# Patient Record
Sex: FEMALE | Race: WHITE | NOT HISPANIC OR LATINO
[De-identification: names, ages, dates, MRNs, and addresses within clinical notes are randomized per-mention and may not be internally consistent; named-entity substitution may affect disease eponyms.]

---

## 2023-02-16 PROBLEM — Z00.00 ENCOUNTER FOR PREVENTIVE HEALTH EXAMINATION: Status: ACTIVE | Noted: 2023-02-16

## 2023-02-17 ENCOUNTER — APPOINTMENT (OUTPATIENT)
Dept: OTOLARYNGOLOGY | Facility: CLINIC | Age: 68
End: 2023-02-17
Payer: MEDICARE

## 2023-02-17 ENCOUNTER — NON-APPOINTMENT (OUTPATIENT)
Age: 68
End: 2023-02-17

## 2023-02-17 VITALS
BODY MASS INDEX: 25.46 KG/M2 | HEIGHT: 68 IN | HEART RATE: 81 BPM | SYSTOLIC BLOOD PRESSURE: 134 MMHG | DIASTOLIC BLOOD PRESSURE: 85 MMHG | WEIGHT: 168 LBS

## 2023-02-17 DIAGNOSIS — Z78.9 OTHER SPECIFIED HEALTH STATUS: ICD-10-CM

## 2023-02-17 DIAGNOSIS — Z82.49 FAMILY HISTORY OF ISCHEMIC HEART DISEASE AND OTHER DISEASES OF THE CIRCULATORY SYSTEM: ICD-10-CM

## 2023-02-17 DIAGNOSIS — H90.3 SENSORINEURAL HEARING LOSS, BILATERAL: ICD-10-CM

## 2023-02-17 DIAGNOSIS — H81.10 BENIGN PAROXYSMAL VERTIGO, UNSPECIFIED EAR: ICD-10-CM

## 2023-02-17 DIAGNOSIS — R03.0 ELEVATED BLOOD-PRESSURE READING, W/OUT DIAGNOSIS OF HYPERTENSION: ICD-10-CM

## 2023-02-17 DIAGNOSIS — Z82.3 FAMILY HISTORY OF STROKE: ICD-10-CM

## 2023-02-17 PROCEDURE — 92567 TYMPANOMETRY: CPT

## 2023-02-17 PROCEDURE — 92557 COMPREHENSIVE HEARING TEST: CPT

## 2023-02-17 PROCEDURE — 99203 OFFICE O/P NEW LOW 30 MIN: CPT

## 2023-02-17 NOTE — ASSESSMENT
[FreeTextEntry1] : Hayde Whiteside presents for evaluation of vertigo. Otoscopic exam was normal. 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 Based on her history, she seems to be having benign paroxysmal positional vertigo. Will refer for vestibular rehab.\par \par - [facility].\par - Follow up in 1 year for repeat audio.\par \par \par - TYPE A TYMPS AU\par HEARING WNL WITH A MILD SNHL [id number] AU

## 2023-02-17 NOTE — CONSULT LETTER
[Dear  ___] : Dear  [unfilled], [Consult Letter:] : I had the pleasure of evaluating your patient, [unfilled]. [Please see my note below.] : Please see my note below. [Consult Closing:] : Thank you very much for allowing me to participate in the care of this patient.  If you have any questions, please do not hesitate to contact me. [Sincerely,] : Sincerely, [FreeTextEntry3] : Des Abdullahi M.D.

## 2023-02-17 NOTE — REVIEW OF SYSTEMS
[Dizziness] : dizziness [Vertigo] : vertigo [Lightheadedness] : lightheadedness [Palpitations] : palpitations [Anxiety] : anxiety [Negative] : Heme/Lymph

## 2023-02-17 NOTE — HISTORY OF PRESENT ILLNESS
[de-identified] : Hayde Whiteside is a 68 yo female who presents for evaluation of vertigo and dysequilibrium. She notes that this began two weeks ago. She notes that this has been occurring with positional changes and head turn. The true vertigo lasts for seconds to minutes, but she has residual dysequilibrium after this. She denies otalgia, otorrhea. She notes chronic constant nonpulsatile tinnitus, likely both in ears. She does not feel that her hearing is declined. She denies fevers, chills. She denies facial weakness or numbness. She denies weakness/numbness of extremities. She has had ear infections previously, but none in the past five years. She denies family history of vertigo or early onset hearing loss. She denies significant noise exposure or exposure to ototoxic medications. She denies recent head trauma.\par \par She is on a cardiac monitor.